# Patient Record
Sex: FEMALE | Race: WHITE | NOT HISPANIC OR LATINO | URBAN - METROPOLITAN AREA
[De-identification: names, ages, dates, MRNs, and addresses within clinical notes are randomized per-mention and may not be internally consistent; named-entity substitution may affect disease eponyms.]

---

## 2022-04-30 ENCOUNTER — TELEPHONE ENCOUNTER (OUTPATIENT)
Dept: URBAN - METROPOLITAN AREA CLINIC 121 | Facility: CLINIC | Age: 62
End: 2022-04-30

## 2022-04-30 RX ORDER — ZOLPIDEM TARTRATE 5 MG
TABLET ORAL
OUTPATIENT
Start: 2012-04-30

## 2022-04-30 RX ORDER — ZOLPIDEM TARTRATE 5 MG
TABLET ORAL
OUTPATIENT
Start: 2012-04-30 | End: 2016-03-01

## 2022-05-01 ENCOUNTER — TELEPHONE ENCOUNTER (OUTPATIENT)
Dept: URBAN - METROPOLITAN AREA CLINIC 121 | Facility: CLINIC | Age: 62
End: 2022-05-01

## 2022-05-01 RX ORDER — ALBENDAZOLE 200 MG/1
TAKE 2 TABLET PO QD TABLET, FILM COATED ORAL
Status: ACTIVE | COMMUNITY
Start: 2020-02-12

## 2022-05-01 RX ORDER — ACETAMINOPHEN AND PHENYLEPHRINE HYDROCHLORIDE 325; 5 MG/1; MG/1
TABLET, COATED ORAL
Status: ACTIVE | COMMUNITY
Start: 2019-09-05

## 2022-05-01 RX ORDER — AMITRIPTYLINE HYDROCHLORIDE 10 MG/1
TAKE 1 TABLET PO QHS FOR 7 DAYS IF TOLERATED THEN 2 TABLETS PO THEREAFTER TABLET, FILM COATED ORAL
Status: ACTIVE | COMMUNITY
Start: 2019-10-24

## 2022-05-01 RX ORDER — DICYCLOMINE HYDROCHLORIDE 10 MG/1
1 CAPSULE PO TID PRN CAPSULE ORAL
Status: ACTIVE | COMMUNITY
Start: 2019-10-17

## 2022-05-01 RX ORDER — CHLORDIAZEPOXIDE HYDROCHLORIDE AND CLIDINIUM BROMIDE 5; 2.5 MG/1; MG/1
1 CAPSULE PO Q 8 HOURS PRN CAPSULE ORAL
Status: ACTIVE | COMMUNITY
Start: 2016-03-01

## 2022-05-01 RX ORDER — MULTIVITAMIN
TABLET ORAL
Status: ACTIVE | COMMUNITY
Start: 2019-09-05

## 2022-05-01 RX ORDER — TRAZODONE HYDROCHLORIDE 50 MG/1
TAKE 1 TABLET PO QHS TABLET ORAL
Status: ACTIVE | COMMUNITY
Start: 2020-02-12

## 2025-04-30 ENCOUNTER — OFFICE VISIT (OUTPATIENT)
Dept: URBAN - METROPOLITAN AREA CLINIC 105 | Facility: CLINIC | Age: 65
End: 2025-04-30

## 2025-04-30 ENCOUNTER — DASHBOARD ENCOUNTERS (OUTPATIENT)
Age: 65
End: 2025-04-30

## 2025-04-30 PROBLEM — 266433003: Status: ACTIVE | Noted: 2025-04-30

## 2025-04-30 PROBLEM — 443719001: Status: ACTIVE | Noted: 2025-04-30

## 2025-04-30 PROBLEM — 84828003: Status: ACTIVE | Noted: 2025-04-30

## 2025-04-30 PROBLEM — 235675006: Status: ACTIVE | Noted: 2025-04-30

## 2025-04-30 PROBLEM — 271737000: Status: ACTIVE | Noted: 2025-04-30

## 2025-04-30 PROBLEM — 302110001: Status: ACTIVE | Noted: 2025-04-30

## 2025-04-30 RX ORDER — ESOMEPRAZOLE SODIUM 40 MG/1
AS DIRECTED INJECTION, POWDER, LYOPHILIZED, FOR SOLUTION INTRAVENOUS
Status: ACTIVE | COMMUNITY

## 2025-04-30 RX ORDER — ENOXAPARIN SODIUM 60 MG/.6ML
AS DIRECTED INJECTION SUBCUTANEOUS
Status: ACTIVE | COMMUNITY

## 2025-04-30 RX ORDER — ALPRAZOLAM 0.5 MG/1
1 TABLET TABLET ORAL TWICE A DAY
Status: ACTIVE | COMMUNITY

## 2025-04-30 RX ORDER — FAMOTIDINE 40 MG/5ML
5 ML POWDER, FOR SUSPENSION ORAL
Qty: 60 ML | Refills: 2 | OUTPATIENT
Start: 2025-04-30

## 2025-04-30 RX ORDER — PROMETHAZINE HYDROCHLORIDE 6.25 MG/5ML
5 TO 10 ML SYRUP ORAL
Qty: 140 ML | Refills: 2 | OUTPATIENT
Start: 2025-04-30

## 2025-04-30 NOTE — HPI-TODAY'S VISIT:
The patient presents for leukocytopenia and nausea.  Today, she says her wbc has dropped and her hem-oc (Dr. Mcneill) wants her to d/c esomeprazole. She first d/c olanzapine 6-7 weeks ago, but low wbc has persisted. She was put on esomeprazole in the first place because of esophagitis and gastritis following cancer radiation and frequent vomiting after her surgery. She initially was on esomeprazole BID then decreased to QD 3 weeks ago. She received pantoprazole IV in hospital. She is having a little bit of heartburn and chest discomfort. Her chest pain lasts a few seconds to a min. She previously had esophageal spasms.  She also has nausea. It started after she d/c Olanzapine. She previously tried Reglan, but that gave her significant diarrhea. The only thing that has worked so far for her nausea is Xanax - 0.5 mg at night relieves her nausea but the next morning wakes up with severe nausea/vomiting and intermittent nausea throughout the day afterwards. She has not trialed Xanax during the day. She has not tried promethazine or compazine. She says her tongue is "coated" and burns.  She is tube feeding - using 1100 ccs. She is eating very little. She was making progress w/ eating until she d/c olanzapine 5 mg SL nightly and subsequently started to have nausea.  Labs 4/28/25 - CBC normal except WBC 3.1. 4/10/25 - CBC normal except WBC 3.27. KATI negative. Zinc, selenium all normal. 3/19/25 - B12, folate, zinc all normal. 2/17/25 - Hgb 10.3, mild leukopenia. CMP normal. 1/28/25 - Hepatic function panel normal except alk phos 171. 9/6/19 - H. pylori breath test negative. 2/11/16 - CBC normal except plt 132.

## 2025-05-03 ENCOUNTER — WEB ENCOUNTER (OUTPATIENT)
Dept: URBAN - METROPOLITAN AREA CLINIC 105 | Facility: CLINIC | Age: 65
End: 2025-05-03

## 2025-05-03 RX ORDER — FAMOTIDINE 40 MG/5ML
5 ML POWDER, FOR SUSPENSION ORAL
Qty: 300 ML | Refills: 1 | OUTPATIENT
Start: 2025-04-30

## 2025-05-27 ENCOUNTER — WEB ENCOUNTER (OUTPATIENT)
Dept: URBAN - METROPOLITAN AREA CLINIC 105 | Facility: CLINIC | Age: 65
End: 2025-05-27

## 2025-07-02 ENCOUNTER — ERX REFILL RESPONSE (OUTPATIENT)
Dept: URBAN - METROPOLITAN AREA CLINIC 105 | Facility: CLINIC | Age: 65
End: 2025-07-02

## 2025-07-02 RX ORDER — FAMOTIDINE 40 MG/5ML
5 ML POWDER, FOR SUSPENSION ORAL
Qty: 300 ML | Refills: 1